# Patient Record
Sex: MALE | Race: WHITE | Employment: UNEMPLOYED | URBAN - METROPOLITAN AREA
[De-identification: names, ages, dates, MRNs, and addresses within clinical notes are randomized per-mention and may not be internally consistent; named-entity substitution may affect disease eponyms.]

---
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
## 2024-05-07 ENCOUNTER — OFFICE VISIT (OUTPATIENT)
Dept: PRIMARY CARE CLINIC | Age: 19
End: 2024-05-07
Payer: OTHER GOVERNMENT

## 2024-05-07 VITALS
HEART RATE: 99 BPM | DIASTOLIC BLOOD PRESSURE: 76 MMHG | WEIGHT: 126.2 LBS | BODY MASS INDEX: 19.81 KG/M2 | OXYGEN SATURATION: 98 % | SYSTOLIC BLOOD PRESSURE: 118 MMHG | HEIGHT: 67 IN

## 2024-05-07 DIAGNOSIS — L42 PITYRIASIS ROSEA: Primary | ICD-10-CM

## 2024-05-07 PROCEDURE — 99212 OFFICE O/P EST SF 10 MIN: CPT | Performed by: STUDENT IN AN ORGANIZED HEALTH CARE EDUCATION/TRAINING PROGRAM

## 2024-05-07 ASSESSMENT — PATIENT HEALTH QUESTIONNAIRE - PHQ9
SUM OF ALL RESPONSES TO PHQ QUESTIONS 1-9: 0
1. LITTLE INTEREST OR PLEASURE IN DOING THINGS: NOT AT ALL
2. FEELING DOWN, DEPRESSED OR HOPELESS: NOT AT ALL
SUM OF ALL RESPONSES TO PHQ9 QUESTIONS 1 & 2: 0

## 2024-05-07 ASSESSMENT — ENCOUNTER SYMPTOMS: SHORTNESS OF BREATH: 0

## 2024-05-07 NOTE — PROGRESS NOTES
[facility]  [facility]  [address] [facility] [address]  Dept: [phone]    Carlos Eduardo Mendoza is a 19 y.o. male established patient, who presents acutely for his complaints as noted below:    Chief Complaint   Patient presents with    Rash     Rash on stomach and back, started 1 week ago.        HPI:     Rash of torso, started one week ago. Itches sometimes. Denies new soaps or detergents. Denies any new OTC medications.     Reviewed prior notes from PCP.  Reviewed previous labs.    No components found for: \"LDLCHOLESTEROL\", \"LDLCALC\"    (goal LDL is <100)   No results found for: \"AST\", \"ALT\", \"BUN\", \"CR\", \"LABA1C\", \"TSH\"  BP Readings from Last 3 Encounters:   05/07/24 118/76   11/16/23 126/66   11/28/22 112/68          (goal 120/80)    No past medical history on file.   Past Surgical History:   Procedure Laterality Date    HERNIA REPAIR         Family History   Problem Relation Age of Onset    High Blood Pressure Mother     High Cholesterol Father     High Blood Pressure Father     Heart Disease Paternal Grandmother     High Blood Pressure Paternal Grandmother     Diabetes Paternal Grandfather     Heart Disease Paternal Grandfather     High Cholesterol Paternal Grandfather     High Blood Pressure Paternal Grandfather        Social History     Tobacco Use    Smoking status: Never    Smokeless tobacco: Never   Substance Use Topics    Alcohol use: Not on file      Current Outpatient Medications   Medication Sig Dispense Refill    fluticasone (FLONASE) 50 MCG/ACT nasal spray 2 sprays by Nasal route daily 1 each 6    loratadine (CLARITIN) 10 MG tablet Take 1 tablet by mouth daily (Patient not taking: Reported on 11/16/2023) 30 tablet 5     No current facility-administered medications for this visit.     Allergies   Allergen Reactions    Penicillins     Sulfa Antibiotics        Health Maintenance   Topic Date Due    COVID-19 Vaccine (1) Never done    Polio vaccine (4 of 4